# Patient Record
Sex: FEMALE | Race: WHITE
[De-identification: names, ages, dates, MRNs, and addresses within clinical notes are randomized per-mention and may not be internally consistent; named-entity substitution may affect disease eponyms.]

---

## 2017-05-03 ENCOUNTER — HOSPITAL ENCOUNTER (EMERGENCY)
Dept: HOSPITAL 80 - FED | Age: 72
Discharge: HOME | End: 2017-05-03
Payer: COMMERCIAL

## 2017-05-03 VITALS
OXYGEN SATURATION: 96 % | SYSTOLIC BLOOD PRESSURE: 142 MMHG | HEART RATE: 74 BPM | RESPIRATION RATE: 16 BRPM | TEMPERATURE: 98.2 F | DIASTOLIC BLOOD PRESSURE: 80 MMHG

## 2017-05-03 DIAGNOSIS — G45.9: Primary | ICD-10-CM

## 2017-05-03 LAB
% IMMATURE GRANULYOCYTES: 0.3 % (ref 0–1.1)
ABSOLUTE IMMATURE GRANULOCYTES: 0.02 10^3/UL (ref 0–0.1)
ABSOLUTE NRBC COUNT: 0 10^3/UL (ref 0–0.01)
ADD DIFF?: NO
ADD MORPH?: NO
ADD SCAN?: NO
ANION GAP SERPL CALC-SCNC: 10 MEQ/L (ref 8–16)
APTT BLD: 27 SEC (ref 23–38)
ATYPICAL LYMPHOCYTE FLAG: 30 (ref 0–99)
CALCIUM SERPL-MCNC: 9.2 MG/DL (ref 8.5–10.4)
CHLORIDE SERPL-SCNC: 106 MEQ/L (ref 97–110)
CO2 SERPL-SCNC: 24 MEQ/L (ref 22–31)
CREAT SERPL-MCNC: 0.7 MG/DL (ref 0.6–1)
ERYTHROCYTE [DISTWIDTH] IN BLOOD BY AUTOMATED COUNT: 12.8 % (ref 11.5–15.2)
FRAGMENT RBC FLAG: 0 (ref 0–99)
GFR SERPL CREATININE-BSD FRML MDRD: > 60 ML/MIN/{1.73_M2}
GLUCOSE SERPL-MCNC: 114 MG/DL (ref 70–100)
HCT VFR BLD CALC: 40.6 % (ref 38–47)
HGB BLD-MCNC: 13.6 G/DL (ref 12.6–16.3)
INR PPP: 1.03 (ref 0.83–1.16)
LEFT SHIFT FLG: 0 (ref 0–99)
LIPEMIA HEMOLYSIS FLAG: 80 (ref 0–99)
MCH RBC BLDCO QN: 31.3 PG (ref 27.9–34.1)
MCHC RBC AUTO-ENTMCNC: 33.5 G/DL (ref 32.4–36.7)
MCV RBC AUTO: 93.3 FL (ref 81.5–99.8)
NRBC-AUTO%: 0 % (ref 0–0.2)
PLATELET # BLD: 238 10^3/UL (ref 150–400)
PLATELET CLUMPS FLAG: 0 (ref 0–99)
PMV BLD AUTO: 9.7 FL (ref 8.7–11.7)
POTASSIUM SERPL-SCNC: 4 MEQ/L (ref 3.5–5.2)
PROTHROMBIN TIME: 13.4 SEC (ref 12–15)
RBC # BLD AUTO: 4.35 10^6/UL (ref 4.18–5.33)
SODIUM SERPL-SCNC: 140 MEQ/L (ref 134–144)
TROPONIN I SERPL-MCNC: < 0.012 NG/ML (ref 0–0.03)

## 2017-05-03 NOTE — CPEKG
Heart Rate: 78

RR Interval: 769

P-R Interval: 168

QRSD Interval: 82

QT Interval: 372

QTC Interval: 424

P Axis: 85

QRS Axis: -55

T Wave Axis: 65

EKG Severity - ABNORMAL ECG -

EKG Impression: SINUS RHYTHM

EKG Impression: LEFT ATRIAL ABNORMALITY

EKG Impression: LEFT ANTERIOR FASCICULAR BLOCK

Electronically Signed By: Cuauhtemoc Oshea 03-May-2017 15:11:04

## 2017-05-03 NOTE — EDPHY
HPI/HX/ROS/PE/MDM


Narrative: 





CHIEF COMPLAINT: Dysphasia. 





HPI: The patient is a 71-year-old female, brought in by EMS, presenting with 

sudden onset of dysphasia. The patient noticed she was speaking in gargles. She 

drove herself to her PCPs office. By the time she arrived her symptoms had 

subsided. The patient states she has experienced this in the past after eating 

dark chocolate. She continues to have aphasia, stating she is having slight 

difficulty recalling things. She additionally notes that she has been under 

increased stress lately. She denies numbness or tingling in her extremities.  

BP during transport was 128/82. 





REVIEW OF SYSTEMS:


Aside from elements discussed in the HPI, a comprehensive 10-point review of 

systems was reviewed and is negative.





PMH: Ocular migraines. 





SOCIAL HISTORY: Single. Lives in [place]. 





PHYSICAL EXAM:


General: Patient is alert, in no acute distress.


ENT: Eyes are normal to inspection.  ENT inspection normal.


Neck:  Normal inspection.  Full range of motion.


Respiratory: No respiratory distress.  Breath sounds normal bilaterally.


Cardiovascular: Regular rate and rhythm.  Strong peripheral pulses.


Abdomen: The abdomen is nontender to palpation. There are no peritoneal signs. 

There are normal bowel sounds.


Back: Normal to inspection.  No tenderness to palpation.


Skin: Normal color.  No rash.  Warm and dry.


Extremities: Normal appearance.  Full range of motion.


Neuro: Oriented x3.  Normal motor function.  Normal sensory function.








ED Course: 





Patient presents with episode of dysphasia. She states she has experienced this 

in the past. She continue to feel aphasic. Patient has not had an MRI in the 

past. Plan for brain MRI. I will check basic labs and Troponin. 





An MRI of the brain was obtained. The results were called to me by the 

radiologist. No sign of stroke. See the full radiology report in the imaging 

section. 





1:30 p.m.: I discussed findings with the patient. I offered admission and she 

declined. 


MDM: 





This patient presents with sudden onset of aphasia which has now completely 

resolved.  This has happened to her several times in the past and appears to be 

linked to her eating dark chocolate, which makes complex migraine a likely 

possibility.  We performed an extensive workup in the ED including MRI christi 

and labs, all of which are negative.  On re-evaluation, her neuro exam remains 

normal.  I offered her admission for full TIA workup but she declines. 





- Data Points


Imaging Results: 


 Imaging Impressions





Brain MRI  05/03/17 12:24


Impression:


1. Mild periventricular and deep hemispheric white matter change that can be 

seen with small vessel ischemic disease. No evidence for acute infarct.


2. Small focus of hemosiderin deposition in right parietal lobe which could be 

secondary to cavernous angioma, prior trauma, or amyloid angiopathy.


3. Mild chronic sinus-related change.


 


Results called and discussed with Cuauhtemoc Oshea M.D. on May 3, 2017 at 

1330 hours. 


 


e:sfg


 











Laboratory Results: 


 Laboratory Results





 05/03/17 12:29 





 05/03/17 12:29 





 











  05/03/17 05/03/17 05/03/17





  12:29 12:29 12:29


 


WBC      7.05 10^3/uL 10^3/uL





     (3.80-9.50) 


 


RBC      4.35 10^6/uL 10^6/uL





     (4.18-5.33) 


 


Hgb      13.6 g/dL g/dL





     (12.6-16.3) 


 


Hct      40.6 % %





     (38.0-47.0) 


 


MCV      93.3 fL fL





     (81.5-99.8) 


 


MCH      31.3 pg pg





     (27.9-34.1) 


 


MCHC      33.5 g/dL g/dL





     (32.4-36.7) 


 


RDW      12.8 % %





     (11.5-15.2) 


 


Plt Count      238 10^3/uL 10^3/uL





     (150-400) 


 


MPV      9.7 fL fL





     (8.7-11.7) 


 


Neut % (Auto)      58.5 % %





     (39.3-74.2) 


 


Lymph % (Auto)      20.7 % %





     (15.0-45.0) 


 


Mono % (Auto)      10.2 % %





     (4.5-13.0) 


 


Eos % (Auto)      9.6 % H %





     (0.6-7.6) 


 


Baso % (Auto)      0.7 % %





     (0.3-1.7) 


 


Nucleat RBC Rel Count      0.0 % %





     (0.0-0.2) 


 


Absolute Neuts (auto)      4.12 10^3/uL 10^3/uL





     (1.70-6.50) 


 


Absolute Lymphs (auto)      1.46 10^3/uL 10^3/uL





     (1.00-3.00) 


 


Absolute Monos (auto)      0.72 10^3/uL 10^3/uL





     (0.30-0.80) 


 


Absolute Eos (auto)      0.68 10^3/uL H 10^3/uL





     (0.03-0.40) 


 


Absolute Basos (auto)      0.05 10^3/uL 10^3/uL





     (0.02-0.10) 


 


Absolute Nucleated RBC      0.00 10^3/uL 10^3/uL





     (0-0.01) 


 


Immature Gran %      0.3 % %





     (0.0-1.1) 


 


Immature Gran #      0.02 10^3/uL 10^3/uL





     (0.00-0.10) 


 


PT    13.4 SEC SEC  





    (12.0-15.0)  


 


INR    1.03   





    (0.83-1.16)  


 


APTT    27.0 SEC SEC  





    (23.0-38.0)  


 


Sodium  140 mEq/L mEq/L    





   (134-144)   


 


Potassium  4.0 mEq/L mEq/L    





   (3.5-5.2)   


 


Chloride  106 mEq/L mEq/L    





   ()   


 


Carbon Dioxide  24 mEq/l mEq/l    





   (22-31)   


 


Anion Gap  10 mEq/L mEq/L    





   (8-16)   


 


BUN  12 mg/dL mg/dL    





   (7-23)   


 


Creatinine  0.7 mg/dL mg/dL    





   (0.6-1.0)   


 


Estimated GFR  > 60     





    


 


Glucose  114 mg/dL H mg/dL    





   ()   


 


Calcium  9.2 mg/dL mg/dL    





   (8.5-10.4)   


 


Troponin I  < 0.012 ng/mL ng/mL    





   (0-0.034)   














General


Initial Vital Signs: 


 Initial Vital Signs











Temperature (C)  37.1 C   05/03/17 12:27


 


Heart Rate  85   05/03/17 12:27


 


Respiratory Rate  17   05/03/17 12:27


 


Blood Pressure  140/86 H  05/03/17 12:27


 


O2 Sat (%)  94   05/03/17 12:27








 











O2 Delivery Mode               Room Air














Allergies/Adverse Reactions: 


 





Penicillins Allergy (Verified 05/03/17 12:27)


 finger swelling








Home Medications: 














 Medication  Instructions  Recorded


 


Hormones  05/03/17














Departure





- Departure


Disposition: Home, Routine, Self-Care


Clinical Impression: 


TIA (transient ischemic attack)


Qualifiers:


 Transient cerebral ischemia type: unspecified Qualified Code(s): G45.9 - 

Transient cerebral ischemic attack, unspecified





Condition: Good


Instructions:  Transient Ischemic Attack (ED)


Additional Instructions: 


Please followup with your primary care physician as needed. Return to the 

Emergency Department with new or worsening symptoms. 


Referrals: 


GRICELDA VEGA [Primary Care Provider] - As per Instructions


Report Scribed for: Cuauhtemoc Oshea


Report Scribed by: Alexandra Gundersen


Date of Report: 05/03/17


Time of Report: 12:33


Physician Review and Approval Statement: Portions of this note were transcribed 

by a medical scribe. I personally performed the history, physical exam, and 

medical decision-making; and confirmed the accuracy of the information in the 

transcribed note.

## 2017-06-09 ENCOUNTER — HOSPITAL ENCOUNTER (OUTPATIENT)
Dept: HOSPITAL 80 - FIMAGING | Age: 72
End: 2017-06-09
Attending: PSYCHIATRY & NEUROLOGY
Payer: COMMERCIAL

## 2017-06-09 DIAGNOSIS — R47.01: ICD-10-CM

## 2017-06-09 DIAGNOSIS — G45.9: Primary | ICD-10-CM

## 2017-06-09 PROCEDURE — 70498 CT ANGIOGRAPHY NECK: CPT

## 2017-06-09 PROCEDURE — 70496 CT ANGIOGRAPHY HEAD: CPT

## 2017-06-20 ENCOUNTER — HOSPITAL ENCOUNTER (OUTPATIENT)
Dept: HOSPITAL 80 - FCPNEURO | Age: 72
End: 2017-06-20
Attending: PSYCHIATRY & NEUROLOGY
Payer: COMMERCIAL

## 2017-06-20 DIAGNOSIS — R47.01: ICD-10-CM

## 2017-06-20 DIAGNOSIS — G45.9: Primary | ICD-10-CM

## 2017-06-20 NOTE — CPEEG
[f rep st]



                                                      ELECTROENCEPHALOGRAM





DATE OF STUDY:  06/20/2017



INTERPRETATION:  This 4-hour video EEG recording is normal.  There were no potentially epileptogenic
 abnormalities present in the awake or sleep studies.  During the video EEG monitoring session, the 
patient denied any clinical events.



REPORT:  This 4-hour video EEG contains 10 Hz alpha to the posterior head regions.  There was no abn
ormal activation at rest, during photic stimulation, or hyperventilation.  The patient became drowsy
 and intermittently fell asleep during the study.  During drowsiness, the patient had intermittent t
emporal transients, maximal left.  This was a normal drowsy finding.  There was no abnormal activati
on during drowsiness, sleep, or during times of arousal.  The patient did not have any clinical even
ts during the video EEG monitoring session.  

.





Job #:  [id number]

## 2017-07-19 ENCOUNTER — HOSPITAL ENCOUNTER (OUTPATIENT)
Dept: HOSPITAL 80 - BHFA | Age: 72
End: 2017-07-19
Attending: INTERNAL MEDICINE
Payer: COMMERCIAL

## 2017-07-19 DIAGNOSIS — G45.9: Primary | ICD-10-CM

## 2017-08-07 ENCOUNTER — HOSPITAL ENCOUNTER (OUTPATIENT)
Dept: HOSPITAL 80 - BHFA | Age: 72
End: 2017-08-07
Attending: INTERNAL MEDICINE
Payer: COMMERCIAL

## 2017-08-07 DIAGNOSIS — G45.9: Primary | ICD-10-CM

## 2017-08-08 ENCOUNTER — HOSPITAL ENCOUNTER (OUTPATIENT)
Dept: HOSPITAL 80 - BHFA | Age: 72
End: 2017-08-08
Payer: COMMERCIAL

## 2017-08-08 DIAGNOSIS — G45.9: Primary | ICD-10-CM

## 2017-09-07 ENCOUNTER — HOSPITAL ENCOUNTER (OUTPATIENT)
Dept: HOSPITAL 80 - FIMAGING | Age: 72
End: 2017-09-07
Attending: OBSTETRICS & GYNECOLOGY
Payer: COMMERCIAL

## 2017-09-07 DIAGNOSIS — Z80.3: ICD-10-CM

## 2017-09-07 DIAGNOSIS — Z12.31: Primary | ICD-10-CM

## 2017-09-07 PROCEDURE — G0202 SCR MAMMO BI INCL CAD: HCPCS

## 2017-09-15 ENCOUNTER — HOSPITAL ENCOUNTER (OUTPATIENT)
Dept: HOSPITAL 80 - FIMAGING | Age: 72
End: 2017-09-15
Attending: OBSTETRICS & GYNECOLOGY
Payer: COMMERCIAL

## 2017-09-15 DIAGNOSIS — Z12.39: Primary | ICD-10-CM

## 2017-09-15 DIAGNOSIS — N63: ICD-10-CM

## 2018-02-26 ENCOUNTER — HOSPITAL ENCOUNTER (EMERGENCY)
Dept: HOSPITAL 80 - FED | Age: 73
Discharge: HOME | End: 2018-02-26
Payer: COMMERCIAL

## 2018-02-26 VITALS
SYSTOLIC BLOOD PRESSURE: 114 MMHG | TEMPERATURE: 97.7 F | DIASTOLIC BLOOD PRESSURE: 78 MMHG | OXYGEN SATURATION: 95 % | RESPIRATION RATE: 14 BRPM | HEART RATE: 82 BPM

## 2018-02-26 DIAGNOSIS — Z87.891: ICD-10-CM

## 2018-02-26 DIAGNOSIS — G43.109: Primary | ICD-10-CM

## 2018-02-26 DIAGNOSIS — E86.9: ICD-10-CM

## 2018-02-26 LAB
INR PPP: 0.97 (ref 0.83–1.16)
PLATELET # BLD: 223 10^3/UL (ref 150–400)
PROTHROMBIN TIME: 13.1 SEC (ref 12–15)

## 2018-02-26 NOTE — EDPHY
H & P


Time Seen by Provider: 02/26/18 14:45


HPI/ROS: 





Chief complaint.  TIA versus migraine





HPI.  72-year-old female presents emergency department with which she describes 

as an ocular migraine that began at noon.  She saw some is exact lines and that 

lasted just a few minutes.  She then had some word-finding difficulty and some 

confusion.  She feels that her word-finding difficulty and confusion seem to be 

off and on and it is continuing.  However her vision has returned to normal.  

She had no numbness tingling or weakness to arms or legs.  No chest pain, 

shortness of breath or abdominal pain.  She did not a headache and does not 

have a headache.  She had similar symptoms in September 2017 with workup





ROS


Constitutional.  no fever/chills, no weakness


Eyes.  Change in vision


ENT.  no sore throat, no nasal drainage


Cardiovascular.  no chest pain


Respiratory.  no shortness of breath, no cough


Abdominal.  no abdominal pain, no nausea/vomiting, no diarrhea


.  no problems urinating


MS.  no calf pain/swelling, no neck/back pain, no joint pain


Skin.  no rash


Lymph.  no swollen glands


Neuro.  Confusion and word-finding difficulty


Past Medical/Surgical History: 





Past medical history for ocular migraines


Social History: 





, nonsmoker, no alcohol


Smoking Status: Former smoker


Physical Exam: 





General Appearance:  Alert well-developed female no distress vital signs


Eyes: Pupils equal and round no pallor or injection.


ENT, Mouth:  Mucous membranes are moist.


Respiratory:  There are no retractions, lungs are clear to auscultation.


Cardiovascular: Regular rate and rhythm.


Gastrointestinal:   Abdomen is soft and nontender, no masses, bowel sounds 

normal.


Neurological:  Awake and alert, sensory and motor exams grossly normal.  Speech 

is normal.  Cranial nerves are normal.  There is no pronator drift.  Finger 

pressure are prior bilat


Skin: Warm and dry, no rashes.


Musculoskeletal:  Neck is supple nontender.


Extremities  symmetrical, full range of motion.


Psychiatric: Patient is oriented X 3, there is no agitation.


Constitutional: 


 Initial Vital Signs











Temperature (C)  36.6 C   02/26/18 13:46


 


Heart Rate  84   02/26/18 13:46


 


Respiratory Rate  16   02/26/18 13:46


 


Blood Pressure  128/75 H  02/26/18 13:46


 


O2 Sat (%)  91 L  02/26/18 13:46








 











O2 Delivery Mode               Room Air














Allergies/Adverse Reactions: 


 





Penicillins Allergy (Verified 05/03/17 12:27)


 finger swelling








Home Medications: 














 Medication  Instructions  Recorded


 


Hormones  05/03/17














Medical Decision Making





- Diagnostics


EKG Interpretation: 





EKG interpreted by me shows normal sinus rhythm normal interval.  There is left 

axis deviation.  There is left anterior fascicular block.  QRS is normal there 

is no significant ST elevation or depression.  No arrhythmia.  Rate is 89





EKG not changed from a previous EKG May 2017


Imaging Results: 


 Imaging Impressions





Head CT  02/26/18 15:13


Impression:


1. No acute intracranial hemorrhage or evidence of acute ischemia.


2. Ethmoid and right maxillary sinus disease.


 


Findings discussed with Emergency Department physician, Flash Cano on 2/26/ 2018, 15:49.


 


 


 








Noncontrast head CT is normal


ED Course/Re-evaluation: 





On re-evaluation patient has no symptoms;  she and I discussed imaging lab EKG 

findings.  We discussed treatment plan including recommendation for admission 

or MRI.  She declines





Both.  I consulted discussed the case with Dr. Espinosa who reviewed the workup 

by Dr. Mcfarland.  The patient has had a full workup since her episode in May 2017.  

Workup has been entirely normal.  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 

migraine episodes.  She has apparently declined management for these episodes.





Dr. Espinosa recommends no further workup in the emergency department today.





This is discussed with the patient and she understands and agrees


Differential Diagnosis: 





I considered TIA, CVA, migraine.





- Data Points


Laboratory Results: 


 Laboratory Results





 02/26/18 13:40 





 02/26/18 13:40 





 











  02/26/18 02/26/18 02/26/18





  14:56 13:40 13:40


 


WBC      





    


 


RBC      





    


 


Hgb      





    


 


Hct      





    


 


MCV      





    


 


MCH      





    


 


MCHC      





    


 


RDW      





    


 


Plt Count      





    


 


MPV      





    


 


Neut % (Auto)      





    


 


Lymph % (Auto)      





    


 


Mono % (Auto)      





    


 


Eos % (Auto)      





    


 


Baso % (Auto)      





    


 


Nucleat RBC Rel Count      





    


 


Absolute Neuts (auto)      





    


 


Absolute Lymphs (auto)      





    


 


Absolute Monos (auto)      





    


 


Absolute Eos (auto)      





    


 


Absolute Basos (auto)      





    


 


Absolute Nucleated RBC      





    


 


Immature Gran %      





    


 


Immature Gran #      





    


 


PT      13.1 SEC SEC





     (12.0-15.0) 


 


INR      0.97 





     (0.83-1.16) 


 


Sodium    144 mEq/L mEq/L  





    (135-145)  


 


Potassium    3.9 mEq/L mEq/L  





    (3.5-5.2)  


 


Chloride    104 mEq/L mEq/L  





    ()  


 


Carbon Dioxide    26 mEq/l mEq/l  





    (22-31)  


 


Anion Gap    14 mEq/L mEq/L  





    (8-16)  


 


BUN    14 mg/dL mg/dL  





    (7-23)  


 


Creatinine    0.8 mg/dL mg/dL  





    (0.6-1.0)  


 


Estimated GFR    > 60   





    


 


Glucose    95 mg/dL mg/dL  





    ()  


 


Calcium    9.6 mg/dL mg/dL  





    (8.5-10.4)  


 


Urine Color  PALE YELLOW     





    


 


Urine Appearance  CLEAR     





    


 


Urine pH  7.0     





   (5.0-7.5)   


 


Ur Specific Gravity  1.005     





   (1.002-1.030)   


 


Urine Protein  NEGATIVE     





   (NEGATIVE)   


 


Urine Ketones  NEGATIVE     





   (NEGATIVE)   


 


Urine Blood  NEGATIVE     





   (NEGATIVE)   


 


Urine Nitrate  NEGATIVE     





   (NEGATIVE)   


 


Urine Bilirubin  NEGATIVE     





   (NEGATIVE)   


 


Urine Urobilinogen  NEGATIVE EU EU    





   (0.2-1.0)   


 


Ur Leukocyte Esterase  NEGATIVE     





   (NEGATIVE)   


 


Urine Glucose  NEGATIVE     





   (NEGATIVE)   














  02/26/18





  13:40


 


WBC  5.29 10^3/uL 10^3/uL





   (3.80-9.50) 


 


RBC  4.29 10^6/uL 10^6/uL





   (4.18-5.33) 


 


Hgb  13.8 g/dL g/dL





   (12.6-16.3) 


 


Hct  41.0 % %





   (38.0-47.0) 


 


MCV  95.6 fL fL





   (81.5-99.8) 


 


MCH  32.2 pg pg





   (27.9-34.1) 


 


MCHC  33.7 g/dL g/dL





   (32.4-36.7) 


 


RDW  13.1 % %





   (11.5-15.2) 


 


Plt Count  223 10^3/uL 10^3/uL





   (150-400) 


 


MPV  10.4 fL fL





   (8.7-11.7) 


 


Neut % (Auto)  57.5 % %





   (39.3-74.2) 


 


Lymph % (Auto)  24.8 % %





   (15.0-45.0) 


 


Mono % (Auto)  10.0 % %





   (4.5-13.0) 


 


Eos % (Auto)  6.4 % %





   (0.6-7.6) 


 


Baso % (Auto)  0.9 % %





   (0.3-1.7) 


 


Nucleat RBC Rel Count  0.0 % %





   (0.0-0.2) 


 


Absolute Neuts (auto)  3.04 10^3/uL 10^3/uL





   (1.70-6.50) 


 


Absolute Lymphs (auto)  1.31 10^3/uL 10^3/uL





   (1.00-3.00) 


 


Absolute Monos (auto)  0.53 10^3/uL 10^3/uL





   (0.30-0.80) 


 


Absolute Eos (auto)  0.34 10^3/uL 10^3/uL





   (0.03-0.40) 


 


Absolute Basos (auto)  0.05 10^3/uL 10^3/uL





   (0.02-0.10) 


 


Absolute Nucleated RBC  0.00 10^3/uL 10^3/uL





   (0-0.01) 


 


Immature Gran %  0.4 % %





   (0.0-1.1) 


 


Immature Gran #  0.02 10^3/uL 10^3/uL





   (0.00-0.10) 


 


PT  





  


 


INR  





  


 


Sodium  





  


 


Potassium  





  


 


Chloride  





  


 


Carbon Dioxide  





  


 


Anion Gap  





  


 


BUN  





  


 


Creatinine  





  


 


Estimated GFR  





  


 


Glucose  





  


 


Calcium  





  


 


Urine Color  





  


 


Urine Appearance  





  


 


Urine pH  





  


 


Ur Specific Gravity  





  


 


Urine Protein  





  


 


Urine Ketones  





  


 


Urine Blood  





  


 


Urine Nitrate  





  


 


Urine Bilirubin  





  


 


Urine Urobilinogen  





  


 


Ur Leukocyte Esterase  





  


 


Urine Glucose  





  











Medications Given: 


 








Discontinued Medications





Sodium Chloride (Ns)  1,000 mls @ 0 mls/hr IV ONCE ONE; Wide Open


   PRN Reason: Protocol


   Stop: 02/26/18 15:13


   Last Admin: 02/26/18 15:56 Dose:  1,000 mls








Departure





- Departure


Disposition: Home, Routine, Self-Care


Clinical Impression: 


 Migraine aura without headache





Condition: Good


Instructions:  Migraine Headache (ED)


Additional Instructions: 


Return for worsening symptoms.  Follow up with Dr. Mcfarland


Referrals: 


Patient,NotPresent [Unknown] - As per Instructions


Fabian Mcfarland MD [Medical Doctor] - 5-7 days, call for appt.

## 2018-02-26 NOTE — CPEKG
Heart Rate: 69

RR Interval: 870

P-R Interval: 180

QRSD Interval: 84

QT Interval: 420

QTC Interval: 450

P Axis: 85

QRS Axis: -63

T Wave Axis: 65

EKG Severity - ABNORMAL ECG -

EKG Impression: SINUS RHYTHM

EKG Impression: LEFT ANTERIOR FASCICULAR BLOCK

Electronically Signed By: Flash Cano 26-Feb-2018 22:34:41

## 2018-09-10 ENCOUNTER — HOSPITAL ENCOUNTER (OUTPATIENT)
Dept: HOSPITAL 80 - FIMAGING | Age: 73
End: 2018-09-10
Attending: OBSTETRICS & GYNECOLOGY
Payer: COMMERCIAL

## 2018-09-10 DIAGNOSIS — Z12.31: Primary | ICD-10-CM

## 2018-09-10 DIAGNOSIS — Z80.3: ICD-10-CM

## 2019-04-09 ENCOUNTER — HOSPITAL ENCOUNTER (OUTPATIENT)
Dept: HOSPITAL 80 - FIMAGING | Age: 74
End: 2019-04-09
Attending: PHYSICIAN ASSISTANT
Payer: COMMERCIAL

## 2019-04-09 DIAGNOSIS — M67.952: ICD-10-CM

## 2019-04-09 DIAGNOSIS — Z87.39: ICD-10-CM

## 2019-04-09 DIAGNOSIS — R10.2: Primary | ICD-10-CM

## 2019-04-09 DIAGNOSIS — M67.951: ICD-10-CM

## 2019-04-09 PROCEDURE — A9585 GADOBUTROL INJECTION: HCPCS

## 2019-04-09 PROCEDURE — 72197 MRI PELVIS W/O & W/DYE: CPT
